# Patient Record
Sex: MALE | Race: OTHER | HISPANIC OR LATINO | URBAN - METROPOLITAN AREA
[De-identification: names, ages, dates, MRNs, and addresses within clinical notes are randomized per-mention and may not be internally consistent; named-entity substitution may affect disease eponyms.]

---

## 2023-06-14 ENCOUNTER — INPATIENT (INPATIENT)
Facility: HOSPITAL | Age: 28
LOS: 1 days | Discharge: HOME | DRG: 392 | End: 2023-06-16
Attending: HOSPITALIST | Admitting: INTERNAL MEDICINE
Payer: MEDICAID

## 2023-06-14 VITALS
DIASTOLIC BLOOD PRESSURE: 89 MMHG | OXYGEN SATURATION: 99 % | HEART RATE: 78 BPM | TEMPERATURE: 99 F | SYSTOLIC BLOOD PRESSURE: 146 MMHG | RESPIRATION RATE: 16 BRPM | HEIGHT: 67 IN | WEIGHT: 179.9 LBS

## 2023-06-14 DIAGNOSIS — R10.9 UNSPECIFIED ABDOMINAL PAIN: ICD-10-CM

## 2023-06-14 LAB
ALBUMIN SERPL ELPH-MCNC: 4.3 G/DL — SIGNIFICANT CHANGE UP (ref 3.3–5)
ALBUMIN SERPL ELPH-MCNC: 4.5 G/DL — SIGNIFICANT CHANGE UP (ref 3.3–5)
ALP SERPL-CCNC: 83 U/L — SIGNIFICANT CHANGE UP (ref 40–120)
ALP SERPL-CCNC: 89 U/L — SIGNIFICANT CHANGE UP (ref 40–120)
ALT FLD-CCNC: 39 U/L — SIGNIFICANT CHANGE UP (ref 10–45)
ALT FLD-CCNC: 48 U/L — HIGH (ref 10–45)
ANION GAP SERPL CALC-SCNC: 9 MMOL/L — SIGNIFICANT CHANGE UP (ref 5–17)
ANION GAP SERPL CALC-SCNC: 9 MMOL/L — SIGNIFICANT CHANGE UP (ref 5–17)
APPEARANCE UR: CLEAR — SIGNIFICANT CHANGE UP
AST SERPL-CCNC: 20 U/L — SIGNIFICANT CHANGE UP (ref 10–40)
AST SERPL-CCNC: 22 U/L — SIGNIFICANT CHANGE UP (ref 10–40)
BASOPHILS # BLD AUTO: 0.05 K/UL — SIGNIFICANT CHANGE UP (ref 0–0.2)
BASOPHILS NFR BLD AUTO: 0.6 % — SIGNIFICANT CHANGE UP (ref 0–2)
BILIRUB DIRECT SERPL-MCNC: 0.2 MG/DL — SIGNIFICANT CHANGE UP (ref 0–0.3)
BILIRUB INDIRECT FLD-MCNC: 0.8 MG/DL — SIGNIFICANT CHANGE UP (ref 0.2–1)
BILIRUB SERPL-MCNC: 0.8 MG/DL — SIGNIFICANT CHANGE UP (ref 0.2–1.2)
BILIRUB SERPL-MCNC: 1 MG/DL — SIGNIFICANT CHANGE UP (ref 0.2–1.2)
BILIRUB UR-MCNC: NEGATIVE — SIGNIFICANT CHANGE UP
BUN SERPL-MCNC: 11 MG/DL — SIGNIFICANT CHANGE UP (ref 7–23)
BUN SERPL-MCNC: 8 MG/DL — SIGNIFICANT CHANGE UP (ref 7–23)
CALCIUM SERPL-MCNC: 8.9 MG/DL — SIGNIFICANT CHANGE UP (ref 8.4–10.5)
CALCIUM SERPL-MCNC: 9.4 MG/DL — SIGNIFICANT CHANGE UP (ref 8.4–10.5)
CHLORIDE SERPL-SCNC: 102 MMOL/L — SIGNIFICANT CHANGE UP (ref 96–108)
CHLORIDE SERPL-SCNC: 102 MMOL/L — SIGNIFICANT CHANGE UP (ref 96–108)
CO2 SERPL-SCNC: 27 MMOL/L — SIGNIFICANT CHANGE UP (ref 22–31)
CO2 SERPL-SCNC: 29 MMOL/L — SIGNIFICANT CHANGE UP (ref 22–31)
COLOR SPEC: YELLOW — SIGNIFICANT CHANGE UP
CREAT SERPL-MCNC: 0.76 MG/DL — SIGNIFICANT CHANGE UP (ref 0.5–1.3)
CREAT SERPL-MCNC: 0.8 MG/DL — SIGNIFICANT CHANGE UP (ref 0.5–1.3)
DIFF PNL FLD: NEGATIVE — SIGNIFICANT CHANGE UP
EGFR: 124 ML/MIN/1.73M2 — SIGNIFICANT CHANGE UP
EGFR: 127 ML/MIN/1.73M2 — SIGNIFICANT CHANGE UP
EOSINOPHIL # BLD AUTO: 0.04 K/UL — SIGNIFICANT CHANGE UP (ref 0–0.5)
EOSINOPHIL NFR BLD AUTO: 0.5 % — SIGNIFICANT CHANGE UP (ref 0–6)
GLUCOSE SERPL-MCNC: 89 MG/DL — SIGNIFICANT CHANGE UP (ref 70–99)
GLUCOSE SERPL-MCNC: 96 MG/DL — SIGNIFICANT CHANGE UP (ref 70–99)
GLUCOSE UR QL: NEGATIVE MG/DL — SIGNIFICANT CHANGE UP
HCT VFR BLD CALC: 50.1 % — HIGH (ref 39–50)
HGB BLD-MCNC: 17.6 G/DL — HIGH (ref 13–17)
IMM GRANULOCYTES NFR BLD AUTO: 0.2 % — SIGNIFICANT CHANGE UP (ref 0–0.9)
KETONES UR-MCNC: NEGATIVE MG/DL — SIGNIFICANT CHANGE UP
LEUKOCYTE ESTERASE UR-ACNC: NEGATIVE — SIGNIFICANT CHANGE UP
LIDOCAIN IGE QN: 68 U/L — LOW (ref 73–393)
LYMPHOCYTES # BLD AUTO: 1.94 K/UL — SIGNIFICANT CHANGE UP (ref 1–3.3)
LYMPHOCYTES # BLD AUTO: 24 % — SIGNIFICANT CHANGE UP (ref 13–44)
MAGNESIUM SERPL-MCNC: 1.9 MG/DL — SIGNIFICANT CHANGE UP (ref 1.6–2.6)
MCHC RBC-ENTMCNC: 32.7 PG — SIGNIFICANT CHANGE UP (ref 27–34)
MCHC RBC-ENTMCNC: 35.1 GM/DL — SIGNIFICANT CHANGE UP (ref 32–36)
MCV RBC AUTO: 93.1 FL — SIGNIFICANT CHANGE UP (ref 80–100)
MONOCYTES # BLD AUTO: 0.54 K/UL — SIGNIFICANT CHANGE UP (ref 0–0.9)
MONOCYTES NFR BLD AUTO: 6.7 % — SIGNIFICANT CHANGE UP (ref 2–14)
NEUTROPHILS # BLD AUTO: 5.49 K/UL — SIGNIFICANT CHANGE UP (ref 1.8–7.4)
NEUTROPHILS NFR BLD AUTO: 68 % — SIGNIFICANT CHANGE UP (ref 43–77)
NITRITE UR-MCNC: NEGATIVE — SIGNIFICANT CHANGE UP
NRBC # BLD: 0 /100 WBCS — SIGNIFICANT CHANGE UP (ref 0–0)
PH UR: 6.5 — SIGNIFICANT CHANGE UP (ref 5–8)
PHOSPHATE SERPL-MCNC: 3.3 MG/DL — SIGNIFICANT CHANGE UP (ref 2.5–4.5)
PLATELET # BLD AUTO: 242 K/UL — SIGNIFICANT CHANGE UP (ref 150–400)
POTASSIUM SERPL-MCNC: 3.5 MMOL/L — SIGNIFICANT CHANGE UP (ref 3.5–5.3)
POTASSIUM SERPL-MCNC: 3.7 MMOL/L — SIGNIFICANT CHANGE UP (ref 3.5–5.3)
POTASSIUM SERPL-SCNC: 3.5 MMOL/L — SIGNIFICANT CHANGE UP (ref 3.5–5.3)
POTASSIUM SERPL-SCNC: 3.7 MMOL/L — SIGNIFICANT CHANGE UP (ref 3.5–5.3)
PROT SERPL-MCNC: 7.6 G/DL — SIGNIFICANT CHANGE UP (ref 6–8.3)
PROT SERPL-MCNC: 8.1 G/DL — SIGNIFICANT CHANGE UP (ref 6–8.3)
PROT UR-MCNC: NEGATIVE MG/DL — SIGNIFICANT CHANGE UP
RBC # BLD: 5.38 M/UL — SIGNIFICANT CHANGE UP (ref 4.2–5.8)
RBC # FLD: 13.2 % — SIGNIFICANT CHANGE UP (ref 10.3–14.5)
SODIUM SERPL-SCNC: 138 MMOL/L — SIGNIFICANT CHANGE UP (ref 135–145)
SODIUM SERPL-SCNC: 140 MMOL/L — SIGNIFICANT CHANGE UP (ref 135–145)
SP GR SPEC: 1.04 — HIGH (ref 1–1.03)
UROBILINOGEN FLD QL: 0.2 MG/DL — SIGNIFICANT CHANGE UP (ref 0.2–1)
WBC # BLD: 8.08 K/UL — SIGNIFICANT CHANGE UP (ref 3.8–10.5)
WBC # FLD AUTO: 8.08 K/UL — SIGNIFICANT CHANGE UP (ref 3.8–10.5)

## 2023-06-14 PROCEDURE — 99223 1ST HOSP IP/OBS HIGH 75: CPT

## 2023-06-14 PROCEDURE — 99285 EMERGENCY DEPT VISIT HI MDM: CPT

## 2023-06-14 PROCEDURE — 71045 X-RAY EXAM CHEST 1 VIEW: CPT | Mod: 26

## 2023-06-14 PROCEDURE — 74177 CT ABD & PELVIS W/CONTRAST: CPT | Mod: 26,MA

## 2023-06-14 RX ORDER — ACETAMINOPHEN 500 MG
650 TABLET ORAL EVERY 6 HOURS
Refills: 0 | Status: DISCONTINUED | OUTPATIENT
Start: 2023-06-14 | End: 2023-06-16

## 2023-06-14 RX ORDER — SODIUM CHLORIDE 9 MG/ML
1000 INJECTION INTRAMUSCULAR; INTRAVENOUS; SUBCUTANEOUS ONCE
Refills: 0 | Status: COMPLETED | OUTPATIENT
Start: 2023-06-14 | End: 2023-06-14

## 2023-06-14 RX ORDER — SODIUM CHLORIDE 9 MG/ML
1000 INJECTION INTRAMUSCULAR; INTRAVENOUS; SUBCUTANEOUS
Refills: 0 | Status: DISCONTINUED | OUTPATIENT
Start: 2023-06-14 | End: 2023-06-14

## 2023-06-14 RX ORDER — ONDANSETRON 8 MG/1
4 TABLET, FILM COATED ORAL ONCE
Refills: 0 | Status: COMPLETED | OUTPATIENT
Start: 2023-06-14 | End: 2023-06-14

## 2023-06-14 RX ORDER — SODIUM CHLORIDE 9 MG/ML
1000 INJECTION, SOLUTION INTRAVENOUS
Refills: 0 | Status: DISCONTINUED | OUTPATIENT
Start: 2023-06-14 | End: 2023-06-16

## 2023-06-14 RX ORDER — HYDROMORPHONE HYDROCHLORIDE 2 MG/ML
0.2 INJECTION INTRAMUSCULAR; INTRAVENOUS; SUBCUTANEOUS EVERY 6 HOURS
Refills: 0 | Status: DISCONTINUED | OUTPATIENT
Start: 2023-06-14 | End: 2023-06-16

## 2023-06-14 RX ORDER — MORPHINE SULFATE 50 MG/1
4 CAPSULE, EXTENDED RELEASE ORAL ONCE
Refills: 0 | Status: DISCONTINUED | OUTPATIENT
Start: 2023-06-14 | End: 2023-06-14

## 2023-06-14 RX ORDER — ONDANSETRON 8 MG/1
4 TABLET, FILM COATED ORAL EVERY 6 HOURS
Refills: 0 | Status: DISCONTINUED | OUTPATIENT
Start: 2023-06-14 | End: 2023-06-16

## 2023-06-14 RX ORDER — PANTOPRAZOLE SODIUM 20 MG/1
40 TABLET, DELAYED RELEASE ORAL
Refills: 0 | Status: DISCONTINUED | OUTPATIENT
Start: 2023-06-14 | End: 2023-06-15

## 2023-06-14 RX ORDER — FAMOTIDINE 10 MG/ML
20 INJECTION INTRAVENOUS ONCE
Refills: 0 | Status: COMPLETED | OUTPATIENT
Start: 2023-06-14 | End: 2023-06-14

## 2023-06-14 RX ORDER — HYDROMORPHONE HYDROCHLORIDE 2 MG/ML
0.5 INJECTION INTRAMUSCULAR; INTRAVENOUS; SUBCUTANEOUS EVERY 6 HOURS
Refills: 0 | Status: DISCONTINUED | OUTPATIENT
Start: 2023-06-14 | End: 2023-06-16

## 2023-06-14 RX ADMIN — SODIUM CHLORIDE 1000 MILLILITER(S): 9 INJECTION INTRAMUSCULAR; INTRAVENOUS; SUBCUTANEOUS at 13:50

## 2023-06-14 RX ADMIN — MORPHINE SULFATE 4 MILLIGRAM(S): 50 CAPSULE, EXTENDED RELEASE ORAL at 14:08

## 2023-06-14 RX ADMIN — FAMOTIDINE 20 MILLIGRAM(S): 10 INJECTION INTRAVENOUS at 14:38

## 2023-06-14 RX ADMIN — PANTOPRAZOLE SODIUM 40 MILLIGRAM(S): 20 TABLET, DELAYED RELEASE ORAL at 18:34

## 2023-06-14 RX ADMIN — SODIUM CHLORIDE 100 MILLILITER(S): 9 INJECTION, SOLUTION INTRAVENOUS at 19:10

## 2023-06-14 RX ADMIN — SODIUM CHLORIDE 1000 MILLILITER(S): 9 INJECTION INTRAMUSCULAR; INTRAVENOUS; SUBCUTANEOUS at 12:51

## 2023-06-14 RX ADMIN — MORPHINE SULFATE 4 MILLIGRAM(S): 50 CAPSULE, EXTENDED RELEASE ORAL at 14:38

## 2023-06-14 RX ADMIN — ONDANSETRON 4 MILLIGRAM(S): 8 TABLET, FILM COATED ORAL at 13:22

## 2023-06-14 RX ADMIN — FAMOTIDINE 100 MILLIGRAM(S): 10 INJECTION INTRAVENOUS at 14:08

## 2023-06-14 NOTE — CONSULT NOTE ADULT - PROBLEM SELECTOR RECOMMENDATION 9
Gastritis vs ulcer  NPO  IV fluids  CT abdomen reviewed- neg pathology seen  Will plan for EGD +/_ colonoscopy friday Gastritis vs ulcer  NPO  IV fluids  CT abdomen reviewed- neg pathology seen  Will plan for EGD +/- colonoscopy friday

## 2023-06-14 NOTE — CONSULT NOTE ADULT - NS ATTEND OPT1 GEN_ALL_CORE
I attest my time as attending is greater than 50% of the total combined time spent on qualifying patient care activities by the PA/NP and attending.
Hector Dorado(Attending)

## 2023-06-14 NOTE — H&P ADULT - NSHPREVIEWOFSYSTEMS_GEN_ALL_CORE
REVIEW OF SYSTEMS:  CONSTITUTIONAL: No fever, weight loss, or fatigue  EYES: No eye pain, visual disturbances, or discharge  ENMT:  No difficulty hearing, tinnitus, vertigo; No sinus or throat pain  NECK: No pain or stiffness  BREASTS: No pain, masses, or nipple discharge  RESPIRATORY: No cough, wheezing, chills or hemoptysis; No shortness of breath  CARDIOVASCULAR: No chest pain, palpitations, dizziness, or leg swelling  GASTROINTESTINAL: +abdominal or epigastric pain. +nausea, +vomiting, or hematemesis; No diarrhea or constipation. No melena or hematochezia.  GENITOURINARY: No dysuria, frequency, hematuria, or incontinence  NEUROLOGICAL: No headaches, memory loss, loss of strength, numbness, or tremors  SKIN: No itching, burning, rashes, or lesions   LYMPH NODES: No enlarged glands  ENDOCRINE: No heat or cold intolerance; No hair loss  MUSCULOSKELETAL: No joint pain or swelling; No muscle, back, or extremity pain  PSYCHIATRIC: No depression, anxiety, mood swings, or difficulty sleeping  HEME/LYMPH: No easy bruising, or bleeding gums  ALLERY AND IMMUNOLOGIC: No hives or eczema    ALL ROS REVIEWED AND NORMAL EXCEPT AS STATED ABOVE

## 2023-06-14 NOTE — ED PROVIDER NOTE - OBJECTIVE STATEMENT
29 yo M w no pertinent PMH presents to the ED c/o left abdominal pain x 15 days. States the pain is a burning sensation that has worsened today, being a 10/10 pain. Pain radiates to the lower back. Pt states he also has been vomiting after he eats his meals, unable to tolerate food. States the vomit consists of dark blood and is yellowish in consistency. States that he has vomited a total of 4 times today. Pt has normal BM, but this morning he had a painful BM. Denies blood in stool. States this has occurred once before 2 months prior where he took ibuprofen for the pain. States the ibuprofen helped him in the past. Denies alcohol, smoking, illicit drug use. Denies HA, fever, chills. SOB, chest pain, D/C, muscle weakness.

## 2023-06-14 NOTE — ED ADULT NURSE NOTE - NSFALLUNIVINTERV_ED_ALL_ED
Bed/Stretcher in lowest position, wheels locked, appropriate side rails in place/Call bell, personal items and telephone in reach/Instruct patient to call for assistance before getting out of bed/chair/stretcher/Non-slip footwear applied when patient is off stretcher/[place] to call system/Physically safe environment - no spills, clutter or unnecessary equipment/Purposeful proactive rounding/Room/bathroom lighting operational, light cord in reach

## 2023-06-14 NOTE — H&P ADULT - NSHPPHYSICALEXAM_GEN_ALL_CORE
T(C): 37.1 (06-14-23 @ 11:24), Max: 37.1 (06-14-23 @ 11:24)  HR: 78 (06-14-23 @ 11:24) (78 - 78)  BP: 146/89 (06-14-23 @ 11:24) (146/89 - 146/89)  RR: 16 (06-14-23 @ 11:24) (16 - 16)  SpO2: 99% (06-14-23 @ 11:24) (99% - 99%)  Wt(kg): --Vital Signs Last 24 Hrs  T(C): 37.1 (14 Jun 2023 11:24), Max: 37.1 (14 Jun 2023 11:24)  T(F): 98.8 (14 Jun 2023 11:24), Max: 98.8 (14 Jun 2023 11:24)  HR: 78 (14 Jun 2023 11:24) (78 - 78)  BP: 146/89 (14 Jun 2023 11:24) (146/89 - 146/89)  BP(mean): --  RR: 16 (14 Jun 2023 11:24) (16 - 16)  SpO2: 99% (14 Jun 2023 11:24) (99% - 99%)    Parameters below as of 14 Jun 2023 11:24  Patient On (Oxygen Delivery Method): room air        PHYSICAL EXAM:  GENERAL: NAD, well-groomed, well-developed  HEAD:  Atraumatic, Normocephalic  EYES: EOMI, PERRLA, conjunctiva and sclera clear  ENMT: No tonsillar erythema, exudates, or enlargement; Moist mucous membranes, Good dentition, No lesions  NECK: Supple, No JVD, Normal thyroid  NERVOUS SYSTEM:  Alert & Oriented X3, Good concentration; Motor Strength 5/5 B/L upper and lower extremities; DTRs 2+ intact and symmetric  CHEST/LUNG: Clear to percussion bilaterally; No rales, rhonchi, wheezing, or rubs  HEART: Regular rate and rhythm; No murmurs, rubs, or gallops  ABDOMEN: Soft, Epigastric tenderness, Nondistended; Bowel sounds present  EXTREMITIES:  2+ Peripheral Pulses, No clubbing, cyanosis, or edema  LYMPH: No lymphadenopathy noted  SKIN: No rashes or lesions

## 2023-06-14 NOTE — CONSULT NOTE ADULT - ASSESSMENT
29 y/o male w no pertinent PMH presents to the ED c/o left abdominal pain x 15 days. Patient seen and examined at bedside. Reports vomiting multiple times, slightly blood tinged mucous noted. Patient had nose bleed after. No fever/chills. Soft bowel movements prior to admission x 3. No brbpr or melena. No etoh abuse. Non smoker.

## 2023-06-14 NOTE — H&P ADULT - NS ATTEND AMEND GEN_ALL_CORE FT
Seen and examined. Chart reviewed.   patient is improving clinically.   Agree with above a/p  Edited where ever is necessary    abd pain better with meds. for EGD/COLON ON 6/16. on CIWA with symptoms trigger ativan. patient reported h/o alcohol abuse. sober for 2 yrs. no cig or drugs. would DC CIWA in am if no sign of withdrawal.

## 2023-06-14 NOTE — ED ADULT NURSE NOTE - CHIEF COMPLAINT QUOTE
Pt has LUQ pain n/v for 15 days.  Pt denies fevers, but has chills and dizziness. Pt states he has blood in the vomit and epistaxis.  Pt in severe pain 10/10.

## 2023-06-14 NOTE — ED PROVIDER NOTE - DISPOSITION TYPE
----- Message from Callie Jenkins RN sent at 8/29/2018  2:49 PM CDT -----  Contact: briana      ----- Message -----  From: Sailaja Chase  Sent: 8/29/2018   2:45 PM  To: Irasema LARSON Staff    ----- Message from [facility], System Message sent at 8/29/2018  2:01 PM CDT -----    Appointment Request From: Roslyn Brewster    With Provider: Turner bennett    Preferred Date Range: 8/29/2018 - 9/7/2018    Preferred Times: Any time    Reason for visit: Medication    Comments:  Would like to speak to someone        ADMIT

## 2023-06-14 NOTE — H&P ADULT - NSHPLABSRESULTS_GEN_ALL_CORE
17.6   8.08  )-----------( 242      ( 2023 12:53 )             50.1       -14    140  |  102  |  11  ----------------------------<  89  3.7   |  29  |  0.80    Ca    9.4      2023 12:53    TPro  8.1  /  Alb  4.5  /  TBili  0.8  /  DBili  x   /  AST  22  /  ALT  48  /  AlkPhos  89  -14                  Urinalysis Basic - ( 2023 13:45 )    Color: Yellow / Appearance: Clear / S.044 / pH: x  Gluc: x / Ketone: Negative mg/dL  / Bili: Negative / Urobili: 0.2 mg/dL   Blood: x / Protein: Negative mg/dL / Nitrite: Negative   Leuk Esterase: Negative / RBC: x / WBC x   Sq Epi: x / Non Sq Epi: x / Bacteria: x    < from: CT Abdomen and Pelvis w/ IV Cont (23 @ 13:23) >    IMPRESSION:  No imaging explanation for left lower quadrant pain.        --- End of Report ---            JAYNE ROSADO MD; Attending Radiologist  This document has been electronically signed. 2023  1:38PM    < end of copied text >

## 2023-06-14 NOTE — H&P ADULT - NSHPSOCIALHISTORY_GEN_ALL_CORE
patient reports living with his brother rigoberto  mother is alive age 49 history of high blood pressure  father is alive age 51 denies any medical history  reports not drinking alcohol for two years, denies drug or nicotine use

## 2023-06-14 NOTE — CONSULT NOTE ADULT - NS ATTEND AMEND GEN_ALL_CORE FT
Patient seen and examined at the bedside. Agree with the assessment and plan of TRELL Mike. Unexplained abd pain. GI workup as above.

## 2023-06-14 NOTE — H&P ADULT - ASSESSMENT
28 year old male with no significant past medical history presents with abdominal pain also with associated nausea and vomiting    Abdominal pain with Nausea and Vomiting  CT AP with IV contrast unremarkable  GI consult appreciated - Gastritis vs Ulcer  Plan to keep NPO for now, IVF, Protonix BID  Plan for EGD +/- Colonoscopy for Friday 6/16  Pain management, Antiemetics, monitor labs    DVT PPx  SCDs    spoke to pt brother rigoberto [phone] and provided an update 28 year old male with no significant past medical history presents with abdominal pain also with associated nausea and vomiting    Abdominal pain with Nausea and Vomiting  CT AP with IV contrast unremarkable  GI consult appreciated - Gastritis vs Ulcer  Plan to keep NPO for now, IVF, Protonix BID  Plan for EGD +/- Colonoscopy for Friday 6/16  Pain management, Antiemetics, monitor labs    Possible Hx ETOH Abuse  maintained on symptom triggered CIWA  monitor for signs of withdrawal    DVT PPx  SCDs    Called brother rigoberto [phone] today in order to provide an update, no answer, left  with call back number

## 2023-06-14 NOTE — CONSULT NOTE ADULT - SUBJECTIVE AND OBJECTIVE BOX
INTERVAL HPI/OVERNIGHT EVENTS:  HPI:  29 y/o male w no pertinent PMH presents to the ED c/o left abdominal pain x 15 days. Patient seen and examined at bedside. Re     States the pain is a burning sensation that has worsened today, being a 10/10 pain. Pain radiates to the lower back. Pt states he also has been vomiting after he eats his meals, unable to tolerate food. States the vomit consists of dark blood and is yellowish in consistency. States that he has vomited a total of 4 times today. Pt has normal BM, but this morning he had a painful BM. Denies blood in stool. States this has occurred once before 2 months prior where he took ibuprofen for the pain. States the ibuprofen helped him in the past. Denies alcohol, smoking, illicit drug use. Denies HA, fever, chills. SOB, chest pain, D/C, muscleMEDICATIONS  (STANDING):    MEDICATIONS  (PRN):      Allergies    No Known Allergies    Intolerances        PAST MEDICAL & SURGICAL HISTORY:  No pertinent past medical history      No significant past surgical history          REVIEW OF SYSTEMS: negative unless indicated in HPI    non smoker  former ETOH abuse       PHYSICAL EXAM:   Vital Signs:  Vital Signs Last 24 Hrs  T(C): 37.1 (2023 11:24), Max: 37.1 (2023 11:24)  T(F): 98.8 (2023 11:24), Max: 98.8 (2023 11:24)  HR: 78 (2023 11:24) (78 - 78)  BP: 146/89 (2023 11:24) (146/89 - 146/89)  BP(mean): --  RR: 16 (2023 11:24) (16 - 16)  SpO2: 99% (2023 11:24) (99% - 99%)    Parameters below as of 2023 11:24  Patient On (Oxygen Delivery Method): room air      Daily Height in cm: 170.18 (2023 11:24)    Daily I&O's Summary      GENERAL:  Appears stated age, well-groomed, well-nourished, no distress  HEENT:  NC/AT, no JVD, sclera anicteric  CHEST:  Full & symmetric excursion, no increased effort, breath sounds clear  HEART:  Regular rhythm, S1, S2  ABDOMEN:  Soft, tender, non-distended, normoactive bowel sounds,    EXTREMITIES:  no edema  SKIN:  No rash  NEURO:  Alert, oriented,       LABS:                        17.6   8.08  )-----------( 242      ( 2023 12:53 )             50.1         140  |  102  |  11  ----------------------------<  89  3.7   |  29  |  0.80    Ca    9.4      2023 12:53    TPro  8.1  /  Alb  4.5  /  TBili  0.8  /  DBili  x   /  AST  22  /  ALT  48<H>  /  AlkPhos  89        Urinalysis Basic - ( 2023 13:45 )    Color: Yellow / Appearance: Clear / S.044 / pH: x  Gluc: x / Ketone: Negative mg/dL  / Bili: Negative / Urobili: 0.2 mg/dL   Blood: x / Protein: Negative mg/dL / Nitrite: Negative   Leuk Esterase: Negative / RBC: x / WBC x   Sq Epi: x / Non Sq Epi: x / Bacteria: x      amylase   lipaseLipase, Serum: 68 U/L ( @ 12:53)    RADIOLOGY & ADDITIONAL TESTS:   INTERVAL HPI/OVERNIGHT EVENTS:  HPI:     States the pain is a burning sensation that has worsened today, being a 10/10 pain. Pain radiates to the lower back. Pt states he also has been vomiting after he eats his meals, unable to tolerate food. States the vomit consists of dark blood and is yellowish in consistency. States that he has vomited a total of 4 times today. Pt has normal BM, but this morning he had a painful BM. Denies blood in stool. States this has occurred once before 2 months prior where he took ibuprofen for the pain. States the ibuprofen helped him in the past. Denies alcohol, smoking, illicit drug use. Denies HA, fever, chills. SOB, chest pain, D/C, muscle    29 y/o male w no pertinent PMH presents to the ED c/o left abdominal pain x 15 days. Patient seen and examined at bedside. Reports vomiting multiple times, slightly blood tinged mucous noted. Patient had nose bleed after. No fever/chills. Soft bowel movements prior to admission x 3. No brbpr or melena. No etoh abuse. Non smoker.       MEDICATIONS  (STANDING):    MEDICATIONS  (PRN):      Allergies    No Known Allergies    Intolerances        PAST MEDICAL & SURGICAL HISTORY:  No pertinent past medical history      No significant past surgical history          REVIEW OF SYSTEMS: negative unless indicated in HPI    non smoker  former ETOH abuse       PHYSICAL EXAM:   Vital Signs:  Vital Signs Last 24 Hrs  T(C): 37.1 (2023 11:24), Max: 37.1 (2023 11:24)  T(F): 98.8 (2023 11:24), Max: 98.8 (2023 11:24)  HR: 78 (2023 11:24) (78 - 78)  BP: 146/89 (2023 11:24) (146/89 - 146/89)  BP(mean): --  RR: 16 (2023 11:24) (16 - 16)  SpO2: 99% (2023 11:24) (99% - 99%)    Parameters below as of 2023 11:24  Patient On (Oxygen Delivery Method): room air      Daily Height in cm: 170.18 (2023 11:24)    Daily I&O's Summary      GENERAL:  Appears stated age, well-groomed, well-nourished, no distress  HEENT:  NC/AT, no JVD, sclera anicteric  CHEST:  Full & symmetric excursion, no increased effort, breath sounds clear  HEART:  Regular rhythm, S1, S2  ABDOMEN:  Soft, tender, non-distended, normoactive bowel sounds,    EXTREMITIES:  no edema  SKIN:  No rash  NEURO:  Alert, oriented,       LABS:                        17.6   8.08  )-----------( 242      ( 2023 12:53 )             50.1         140  |  102  |  11  ----------------------------<  89  3.7   |  29  |  0.80    Ca    9.4      2023 12:53    TPro  8.1  /  Alb  4.5  /  TBili  0.8  /  DBili  x   /  AST  22  /  ALT  48<H>  /  AlkPhos  89        Urinalysis Basic - ( 2023 13:45 )    Color: Yellow / Appearance: Clear / S.044 / pH: x  Gluc: x / Ketone: Negative mg/dL  / Bili: Negative / Urobili: 0.2 mg/dL   Blood: x / Protein: Negative mg/dL / Nitrite: Negative   Leuk Esterase: Negative / RBC: x / WBC x   Sq Epi: x / Non Sq Epi: x / Bacteria: x      amylase   lipaseLipase, Serum: 68 U/L ( @ 12:53)    RADIOLOGY & ADDITIONAL TESTS:   INTERVAL HPI/OVERNIGHT EVENTS:  HPI:   29 y/o male w no pertinent PMH presents to the ED c/o left abdominal pain x 15 days. Patient seen and examined at bedside. Reports vomiting multiple times, slightly blood tinged mucous noted. Patient had nose bleed after. No fever/chills. Soft bowel movements prior to admission x 3. No brbpr or melena. No etoh abuse. Non smoker.   States the pain is a burning sensation that has worsened today, being a 10/10 pain. Pain radiates to the lower back. Pt states he also has been vomiting after he eats his meals, unable to tolerate food. States the vomit consists of dark blood and is yellowish in consistency. States that he has vomited a total of 4 times today. Pt has normal BM, but this morning he had a painful BM. Denies blood in stool. States this has occurred once before 2 months prior where he took ibuprofen for the pain. States the ibuprofen helped him in the past. Denies alcohol, smoking, illicit drug use. Denies HA, fever, chills. SOB, chest pain, D/C, muscle      Home Medications:None    Allergies    No Known Allergies    Intolerances        PAST MEDICAL & SURGICAL HISTORY:  No pertinent past medical history      No significant past surgical history          REVIEW OF SYSTEMS: negative unless indicated in HPI    non smoker  former ETOH abuse       PHYSICAL EXAM:   Vital Signs:  Vital Signs Last 24 Hrs  T(C): 37.1 (2023 11:24), Max: 37.1 (2023 11:24)  T(F): 98.8 (2023 11:24), Max: 98.8 (2023 11:24)  HR: 78 (2023 11:24) (78 - 78)  BP: 146/89 (2023 11:24) (146/89 - 146/89)  BP(mean): --  RR: 16 (2023 11:24) (16 - 16)  SpO2: 99% (2023 11:24) (99% - 99%)    Parameters below as of 2023 11:24  Patient On (Oxygen Delivery Method): room air      Daily Height in cm: 170.18 (2023 11:24)    Daily I&O's Summary      GENERAL:  Appears stated age, well-groomed, well-nourished, no distress  HEENT:  NC/AT, no JVD, sclera anicteric  CHEST:  Full & symmetric excursion, no increased effort, breath sounds clear  HEART:  Regular rhythm, S1, S2  ABDOMEN:  Soft, tender, non-distended, normoactive bowel sounds,    EXTREMITIES:  no edema  SKIN:  No rash  NEURO:  Alert, oriented      LABS:                        17.6   8.08  )-----------( 242      ( 2023 12:53 )             50.1         140  |  102  |  11  ----------------------------<  89  3.7   |  29  |  0.80    Ca    9.4      2023 12:53    TPro  8.1  /  Alb  4.5  /  TBili  0.8  /  DBili  x   /  AST  22  /  ALT  48<H>  /  AlkPhos  89        Urinalysis Basic - ( 2023 13:45 )    Color: Yellow / Appearance: Clear / S.044 / pH: x  Gluc: x / Ketone: Negative mg/dL  / Bili: Negative / Urobili: 0.2 mg/dL   Blood: x / Protein: Negative mg/dL / Nitrite: Negative   Leuk Esterase: Negative / RBC: x / WBC x   Sq Epi: x / Non Sq Epi: x / Bacteria: x      Lipase, Serum: 68 U/L ( @ 12:53)          ACC: [id number] EXAM:  CT ABDOMEN AND PELVIS IC   ORDERED BY: BENJI RODRIGUEZ     PROCEDURE DATE:  2023          INTERPRETATION:  CLINICAL INFORMATION: Left lower quadrant tenderness.    COMPARISON: None.    CONTRAST/COMPLICATIONS:  IV Contrast: Omnipaque 350  90 cc administered   10 cc discarded  Oral Contrast: NONE  Complications: None reported at time of study completion    PROCEDURE:  CT of the Abdomen and Pelvis was performed.  Sagittal and coronal reformats were performed. The imaging was performed   in the excretory phase of contrast within the kidneys.    FINDINGS:  LOWER CHEST: Within normal limits.    LIVER: Within normal limits.  BILE DUCTS: Normal caliber.  GALLBLADDER: Within normal limits.  SPLEEN: Within normal limits.  PANCREAS: Within normal limits.  ADRENALS: Within normal limits.  KIDNEYS/URETERS: Within normal limits.    BLADDER: Within normal limits. Artifact related to the presence of   diluted excreted contrast.  REPRODUCTIVE ORGANS: Mildly enlarged prostate.    BOWEL: No bowel obstruction. Appendix is normal. Ingested radiopaque   material within the small bowel.  PERITONEUM: No ascites.  VESSELS: Circumaortic left renal veins.  RETROPERITONEUM/LYMPH NODES: No lymphadenopathy.  ABDOMINAL WALL: Withinnormal limits.  BONES: Within normal limits.    IMPRESSION:  No imaging explanation for left lower quadrant pain.        --- End of Report ---            JAYNE ROSADO MD; Attending Radiologist  This document has been electronically signed. 2023  1:38PM

## 2023-06-14 NOTE — ED ADULT TRIAGE NOTE - CHIEF COMPLAINT QUOTE
Pt has LUQ pain n/v for 15 days.  Pt denies fevers, but has chills and dizziness. pt states he has blood in the vomit and epistaxis. Pt has LUQ pain n/v for 15 days.  Pt denies fevers, but has chills and dizziness. Pt states he has blood in the vomit and epistaxis.  Pt in severe pain 10/10.

## 2023-06-14 NOTE — ED PROVIDER NOTE - CLINICAL SUMMARY MEDICAL DECISION MAKING FREE TEXT BOX
27 yo M w no pertinent PMH presents to the ED c/o left abdominal pain x 15 days. States the pain is a burning sensation that has worsened today, being a 10/10 pain. Pain radiates to the lower back. Pt states he also has been vomiting after he eats his meals, unable to tolerate food. States the vomit consists of dark blood and is yellowish in consistency. States that he has vomited a total of 4 times today. Pt has normal BM, but this morning he had a painful BM. Denies blood in stool. States this has occurred once before 2 months prior where he took ibuprofen for the pain. States the ibuprofen helped him in the past. Denies alcohol, smoking, illicit drug use. Denies HA, fever, chills. SOB, chest pain, D/C, muscle weakness.   LLQ and LUQ tenderness on exam   will check labs, ua, CT abd pelvis, pain control, zofran ordered and will re eval   Pt has no PMD. 29 yo M w no pertinent PMH presents to the ED c/o left abdominal pain x 15 days. States the pain is a burning sensation that has worsened today, being a 10/10 pain. Pain radiates to the lower back. Pt states he also has been vomiting after he eats his meals, unable to tolerate food. States the vomit consists of dark blood and is yellowish in consistency. States that he has vomited a total of 4 times today. Pt has normal BM, but this morning he had a painful BM. Denies blood in stool. States this has occurred once before 2 months prior where he took ibuprofen for the pain. States the ibuprofen helped him in the past. Denies alcohol, smoking, illicit drug use. Denies HA, fever, chills. SOB, chest pain, D/C, muscle weakness.   LLQ and LUQ tenderness on exam   will check labs, ua, CT abd pelvis, pain control, zofran ordered and will re eval   Pt has no PMD.   CT and labs reviewed   pt still in pain , GI called for c/s   1530: GI seen pt, TRELL Fields, and recommends pt for admission. Pt agrees with plan for admission. d/w Dr. Mooney

## 2023-06-15 LAB
ALBUMIN SERPL ELPH-MCNC: 3.8 G/DL — SIGNIFICANT CHANGE UP (ref 3.3–5)
ALP SERPL-CCNC: 70 U/L — SIGNIFICANT CHANGE UP (ref 40–120)
ALT FLD-CCNC: 39 U/L — SIGNIFICANT CHANGE UP (ref 10–45)
ANION GAP SERPL CALC-SCNC: 11 MMOL/L — SIGNIFICANT CHANGE UP (ref 5–17)
ANION GAP SERPL CALC-SCNC: 8 MMOL/L — SIGNIFICANT CHANGE UP (ref 5–17)
AST SERPL-CCNC: 23 U/L — SIGNIFICANT CHANGE UP (ref 10–40)
BILIRUB SERPL-MCNC: 1.3 MG/DL — HIGH (ref 0.2–1.2)
BUN SERPL-MCNC: 10 MG/DL — SIGNIFICANT CHANGE UP (ref 7–23)
BUN SERPL-MCNC: 10 MG/DL — SIGNIFICANT CHANGE UP (ref 7–23)
CALCIUM SERPL-MCNC: 8.6 MG/DL — SIGNIFICANT CHANGE UP (ref 8.4–10.5)
CALCIUM SERPL-MCNC: 8.7 MG/DL — SIGNIFICANT CHANGE UP (ref 8.4–10.5)
CHLORIDE SERPL-SCNC: 102 MMOL/L — SIGNIFICANT CHANGE UP (ref 96–108)
CHLORIDE SERPL-SCNC: 102 MMOL/L — SIGNIFICANT CHANGE UP (ref 96–108)
CO2 SERPL-SCNC: 26 MMOL/L — SIGNIFICANT CHANGE UP (ref 22–31)
CO2 SERPL-SCNC: 27 MMOL/L — SIGNIFICANT CHANGE UP (ref 22–31)
CREAT SERPL-MCNC: 0.83 MG/DL — SIGNIFICANT CHANGE UP (ref 0.5–1.3)
CREAT SERPL-MCNC: 0.86 MG/DL — SIGNIFICANT CHANGE UP (ref 0.5–1.3)
CRP SERPL-MCNC: <3 MG/L — SIGNIFICANT CHANGE UP
EGFR: 122 ML/MIN/1.73M2 — SIGNIFICANT CHANGE UP
EGFR: 123 ML/MIN/1.73M2 — SIGNIFICANT CHANGE UP
GLUCOSE SERPL-MCNC: 82 MG/DL — SIGNIFICANT CHANGE UP (ref 70–99)
GLUCOSE SERPL-MCNC: 84 MG/DL — SIGNIFICANT CHANGE UP (ref 70–99)
HCT VFR BLD CALC: 49.4 % — SIGNIFICANT CHANGE UP (ref 39–50)
HGB BLD-MCNC: 17 G/DL — SIGNIFICANT CHANGE UP (ref 13–17)
MAGNESIUM SERPL-MCNC: 2.2 MG/DL — SIGNIFICANT CHANGE UP (ref 1.6–2.6)
MCHC RBC-ENTMCNC: 32.3 PG — SIGNIFICANT CHANGE UP (ref 27–34)
MCHC RBC-ENTMCNC: 34.4 GM/DL — SIGNIFICANT CHANGE UP (ref 32–36)
MCV RBC AUTO: 93.7 FL — SIGNIFICANT CHANGE UP (ref 80–100)
NRBC # BLD: 0 /100 WBCS — SIGNIFICANT CHANGE UP (ref 0–0)
PHOSPHATE SERPL-MCNC: 3.5 MG/DL — SIGNIFICANT CHANGE UP (ref 2.5–4.5)
PLATELET # BLD AUTO: 201 K/UL — SIGNIFICANT CHANGE UP (ref 150–400)
POTASSIUM SERPL-MCNC: 3.8 MMOL/L — SIGNIFICANT CHANGE UP (ref 3.5–5.3)
POTASSIUM SERPL-MCNC: 3.9 MMOL/L — SIGNIFICANT CHANGE UP (ref 3.5–5.3)
POTASSIUM SERPL-SCNC: 3.8 MMOL/L — SIGNIFICANT CHANGE UP (ref 3.5–5.3)
POTASSIUM SERPL-SCNC: 3.9 MMOL/L — SIGNIFICANT CHANGE UP (ref 3.5–5.3)
PROT SERPL-MCNC: 7.2 G/DL — SIGNIFICANT CHANGE UP (ref 6–8.3)
RBC # BLD: 5.27 M/UL — SIGNIFICANT CHANGE UP (ref 4.2–5.8)
RBC # FLD: 13.3 % — SIGNIFICANT CHANGE UP (ref 10.3–14.5)
SODIUM SERPL-SCNC: 137 MMOL/L — SIGNIFICANT CHANGE UP (ref 135–145)
SODIUM SERPL-SCNC: 139 MMOL/L — SIGNIFICANT CHANGE UP (ref 135–145)
WBC # BLD: 6.16 K/UL — SIGNIFICANT CHANGE UP (ref 3.8–10.5)
WBC # FLD AUTO: 6.16 K/UL — SIGNIFICANT CHANGE UP (ref 3.8–10.5)

## 2023-06-15 PROCEDURE — 99233 SBSQ HOSP IP/OBS HIGH 50: CPT

## 2023-06-15 PROCEDURE — 99232 SBSQ HOSP IP/OBS MODERATE 35: CPT

## 2023-06-15 RX ORDER — PANTOPRAZOLE SODIUM 20 MG/1
40 TABLET, DELAYED RELEASE ORAL EVERY 12 HOURS
Refills: 0 | Status: DISCONTINUED | OUTPATIENT
Start: 2023-06-15 | End: 2023-06-16

## 2023-06-15 RX ORDER — SODIUM CHLORIDE 9 MG/ML
1000 INJECTION INTRAMUSCULAR; INTRAVENOUS; SUBCUTANEOUS
Refills: 0 | Status: DISCONTINUED | OUTPATIENT
Start: 2023-06-15 | End: 2023-06-16

## 2023-06-15 RX ADMIN — Medication 650 MILLIGRAM(S): at 10:12

## 2023-06-15 RX ADMIN — Medication 650 MILLIGRAM(S): at 11:46

## 2023-06-15 RX ADMIN — SODIUM CHLORIDE 100 MILLILITER(S): 9 INJECTION, SOLUTION INTRAVENOUS at 15:11

## 2023-06-15 RX ADMIN — PANTOPRAZOLE SODIUM 40 MILLIGRAM(S): 20 TABLET, DELAYED RELEASE ORAL at 17:35

## 2023-06-15 RX ADMIN — PANTOPRAZOLE SODIUM 40 MILLIGRAM(S): 20 TABLET, DELAYED RELEASE ORAL at 08:07

## 2023-06-15 NOTE — PROGRESS NOTE ADULT - PROBLEM SELECTOR PLAN 1
Due to persistent pain will pursue EGD. Explained to patient and he agrees to procedure.  Continue current meds.  Clear liquids ok now, NPO after midnight.

## 2023-06-16 ENCOUNTER — TRANSCRIPTION ENCOUNTER (OUTPATIENT)
Age: 28
End: 2023-06-16

## 2023-06-16 ENCOUNTER — RESULT REVIEW (OUTPATIENT)
Age: 28
End: 2023-06-16

## 2023-06-16 VITALS
RESPIRATION RATE: 20 BRPM | OXYGEN SATURATION: 98 % | TEMPERATURE: 98 F | HEIGHT: 67.01 IN | HEART RATE: 54 BPM | DIASTOLIC BLOOD PRESSURE: 80 MMHG | SYSTOLIC BLOOD PRESSURE: 125 MMHG | WEIGHT: 179.9 LBS

## 2023-06-16 PROBLEM — Z00.00 ENCOUNTER FOR PREVENTIVE HEALTH EXAMINATION: Status: ACTIVE | Noted: 2023-06-16

## 2023-06-16 LAB
ANION GAP SERPL CALC-SCNC: 8 MMOL/L — SIGNIFICANT CHANGE UP (ref 5–17)
BUN SERPL-MCNC: 9 MG/DL — SIGNIFICANT CHANGE UP (ref 7–23)
CALCIUM SERPL-MCNC: 8.4 MG/DL — SIGNIFICANT CHANGE UP (ref 8.4–10.5)
CHLORIDE SERPL-SCNC: 103 MMOL/L — SIGNIFICANT CHANGE UP (ref 96–108)
CO2 SERPL-SCNC: 29 MMOL/L — SIGNIFICANT CHANGE UP (ref 22–31)
CREAT SERPL-MCNC: 0.88 MG/DL — SIGNIFICANT CHANGE UP (ref 0.5–1.3)
CULTURE RESULTS: SIGNIFICANT CHANGE UP
EGFR: 121 ML/MIN/1.73M2 — SIGNIFICANT CHANGE UP
GLUCOSE SERPL-MCNC: 89 MG/DL — SIGNIFICANT CHANGE UP (ref 70–99)
HCT VFR BLD CALC: 47.8 % — SIGNIFICANT CHANGE UP (ref 39–50)
HGB BLD-MCNC: 16.7 G/DL — SIGNIFICANT CHANGE UP (ref 13–17)
MCHC RBC-ENTMCNC: 32.4 PG — SIGNIFICANT CHANGE UP (ref 27–34)
MCHC RBC-ENTMCNC: 34.9 GM/DL — SIGNIFICANT CHANGE UP (ref 32–36)
MCV RBC AUTO: 92.8 FL — SIGNIFICANT CHANGE UP (ref 80–100)
NRBC # BLD: 0 /100 WBCS — SIGNIFICANT CHANGE UP (ref 0–0)
PLATELET # BLD AUTO: 201 K/UL — SIGNIFICANT CHANGE UP (ref 150–400)
POTASSIUM SERPL-MCNC: 3.9 MMOL/L — SIGNIFICANT CHANGE UP (ref 3.5–5.3)
POTASSIUM SERPL-SCNC: 3.9 MMOL/L — SIGNIFICANT CHANGE UP (ref 3.5–5.3)
RBC # BLD: 5.15 M/UL — SIGNIFICANT CHANGE UP (ref 4.2–5.8)
RBC # FLD: 12.9 % — SIGNIFICANT CHANGE UP (ref 10.3–14.5)
SODIUM SERPL-SCNC: 140 MMOL/L — SIGNIFICANT CHANGE UP (ref 135–145)
SPECIMEN SOURCE: SIGNIFICANT CHANGE UP
WBC # BLD: 6.01 K/UL — SIGNIFICANT CHANGE UP (ref 3.8–10.5)
WBC # FLD AUTO: 6.01 K/UL — SIGNIFICANT CHANGE UP (ref 3.8–10.5)

## 2023-06-16 PROCEDURE — 88305 TISSUE EXAM BY PATHOLOGIST: CPT | Mod: 26

## 2023-06-16 PROCEDURE — 99239 HOSP IP/OBS DSCHRG MGMT >30: CPT

## 2023-06-16 PROCEDURE — 88312 SPECIAL STAINS GROUP 1: CPT | Mod: 26

## 2023-06-16 PROCEDURE — 43239 EGD BIOPSY SINGLE/MULTIPLE: CPT

## 2023-06-16 RX ORDER — PANTOPRAZOLE SODIUM 20 MG/1
1 TABLET, DELAYED RELEASE ORAL
Qty: 30 | Refills: 0
Start: 2023-06-16 | End: 2023-07-15

## 2023-06-16 RX ADMIN — Medication 650 MILLIGRAM(S): at 01:01

## 2023-06-16 RX ADMIN — Medication 650 MILLIGRAM(S): at 00:31

## 2023-06-16 NOTE — DISCHARGE NOTE PROVIDER - NSDCFUSCHEDAPPT_GEN_ALL_CORE_FT
[facility] Physician [facility]  [facility]  Scheduled Appointment: 06/22/2023

## 2023-06-16 NOTE — PROGRESS NOTE ADULT - SUBJECTIVE AND OBJECTIVE BOX
Patient is a 28y old  Male who presents with a chief complaint of Abdominal Pain (15 Salvador 2023 18:00)        Patient seen and examined at bedside. No overnight events reported.     ALLERGIES:  No Known Allergies    MEDICATIONS  (STANDING):  lactated ringers. 1000 milliLiter(s) (100 mL/Hr) IV Continuous <Continuous>  pantoprazole    Tablet 40 milliGRAM(s) Oral every 12 hours  sodium chloride 0.9%. 1000 milliLiter(s) (100 mL/Hr) IV Continuous <Continuous>    MEDICATIONS  (PRN):  acetaminophen     Tablet .. 650 milliGRAM(s) Oral every 6 hours PRN Temp greater or equal to 38C (100.4F), Mild Pain (1 - 3)  aluminum hydroxide/magnesium hydroxide/simethicone Suspension 30 milliLiter(s) Oral every 6 hours PRN Dyspepsia  HYDROmorphone  Injectable 0.2 milliGRAM(s) IV Push every 6 hours PRN Moderate Pain (4 - 6)  HYDROmorphone  Injectable 0.5 milliGRAM(s) IV Push every 6 hours PRN Severe Pain (7 - 10)  LORazepam   Injectable 2 milliGRAM(s) IV Push every 2 hours PRN CIWA-Ar score 8 or greater  ondansetron Injectable 4 milliGRAM(s) IV Push every 6 hours PRN Nausea and/or Vomiting    Vital Signs Last 24 Hrs  T(F): 97.7 (2023 05:18), Max: 98.4 (2023 01:10)  HR: 55 (2023 05:18) (51 - 55)  BP: 116/73 (2023 05:18) (116/73 - 121/76)  RR: 14 (2023 05:18) (14 - 18)  SpO2: 99% (2023 05:18) (98% - 99%)  I&O's Summary    PHYSICAL EXAM:  General: NAD, A/O x 3  ENT: No gross hearing impairment, Moist mucous membranes, no thrush  Neck: Supple, No JVD  Lungs: Clear to auscultation bilaterally, good air entry, non-labored breathing  Cardio: RRR, S1/S2, No murmur  Abdomen: Soft, Nontender, Nondistended; Bowel sounds present  Extremities: No calf tenderness, No cyanosis, No pitting edema  Psych: Appropriate mood and affect    LABS:                        16.7   6.01  )-----------( 201      ( 2023 06:52 )             47.8     06-16    140  |  103  |  9   ----------------------------<  89  3.9   |  29  |  0.88    Ca    8.4      2023 06:52  Phos  3.5     06-15  Mg     2.2     06-15    TPro  7.2  /  Alb  3.8  /  TBili  1.3  /  DBili  x   /  AST  23  /  ALT  39  /  AlkPhos  70  06-15    Lipase, Serum: 68 U/L (23 @ 12:53)    Urinalysis Basic - ( 2023 13:45 )    Color: Yellow / Appearance: Clear / S.044 / pH: x  Gluc: x / Ketone: Negative mg/dL  / Bili: Negative / Urobili: 0.2 mg/dL   Blood: x / Protein: Negative mg/dL / Nitrite: Negative   Leuk Esterase: Negative / RBC: x / WBC x   Sq Epi: x / Non Sq Epi: x / Bacteria: x    RADIOLOGY & ADDITIONAL TESTS:    Care Discussed with Consultants/Other Providers:   
Patient is a 28y old  Male who presents with a chief complaint of Abdominal Pain (2023 16:43)      SUBJECTIVE / OVERNIGHT EVENTS:   used   Pt seen and examined at bedside in the presence of his family member. No acute events overnight. Endorses LUQ pain and tenderness  Pt denies cp, palpitations, sob, N/V, fever, chills.    ROS:  All other review of systems negative    Allergies    No Known Allergies    Intolerances        MEDICATIONS  (STANDING):  lactated ringers. 1000 milliLiter(s) (100 mL/Hr) IV Continuous <Continuous>  pantoprazole  Injectable 40 milliGRAM(s) IV Push two times a day    MEDICATIONS  (PRN):  acetaminophen     Tablet .. 650 milliGRAM(s) Oral every 6 hours PRN Temp greater or equal to 38C (100.4F), Mild Pain (1 - 3)  aluminum hydroxide/magnesium hydroxide/simethicone Suspension 30 milliLiter(s) Oral every 6 hours PRN Dyspepsia  HYDROmorphone  Injectable 0.2 milliGRAM(s) IV Push every 6 hours PRN Moderate Pain (4 - 6)  HYDROmorphone  Injectable 0.5 milliGRAM(s) IV Push every 6 hours PRN Severe Pain (7 - 10)  LORazepam   Injectable 2 milliGRAM(s) IV Push every 2 hours PRN CIWA-Ar score 8 or greater  ondansetron Injectable 4 milliGRAM(s) IV Push every 6 hours PRN Nausea and/or Vomiting      Vital Signs Last 24 Hrs  T(C): 36.3 (15 Salvador 2023 05:36), Max: 37.1 (2023 11:24)  T(F): 97.4 (15 Salvador 2023 05:36), Max: 98.8 (2023 11:24)  HR: 60 (15 Salvador 2023 05:36) (60 - 81)  BP: 105/59 (15 Salvador 2023 05:36) (105/59 - 146/89)  BP(mean): --  RR: 18 (15 Salvador 2023 05:36) (16 - 20)  SpO2: 98% (15 Salvador 2023 05:36) (97% - 100%)    Parameters below as of 15 Salvador 2023 05:36  Patient On (Oxygen Delivery Method): room air      CAPILLARY BLOOD GLUCOSE        I&O's Summary      PHYSICAL EXAM:  GENERAL: NAD, well-developed male  HEAD:  Atraumatic, Normocephalic  NECK: Supple, No JVD  CHEST/LUNG: Clear to auscultation bilaterally; No wheeze, nonlabored breathing  HEART: Regular rate and rhythm; No murmurs, rubs, or gallops  ABDOMEN: Soft, LUQ tenderness, Nondistended; Bowel sounds present  EXTREMITIES:  No clubbing, cyanosis, or edema  NEUROLOGY: AAOx3, non-focal  PSYCH: calm, appropriate mood    LABS:                        17.0   6.16  )-----------( 201      ( 15 Salvador 2023 06:56 )             49.4     06-15    137  |  102  |  10  ----------------------------<  82  3.9   |  27  |  0.83    Ca    8.6      15 Salvadro 2023 06:56  Phos  3.5     06-15  Mg     2.2     06-15    TPro  7.2  /  Alb  3.8  /  TBili  1.3<H>  /  DBili  x   /  AST  23  /  ALT  39  /  AlkPhos  70  06-15          Urinalysis Basic - ( 2023 13:45 )    Color: Yellow / Appearance: Clear / S.044 / pH: x  Gluc: x / Ketone: Negative mg/dL  / Bili: Negative / Urobili: 0.2 mg/dL   Blood: x / Protein: Negative mg/dL / Nitrite: Negative   Leuk Esterase: Negative / RBC: x / WBC x   Sq Epi: x / Non Sq Epi: x / Bacteria: x        RADIOLOGY & ADDITIONAL TESTS:  Results Reviewed:   Imaging Personally Reviewed:  Electrocardiogram Personally Reviewed:    COORDINATION OF CARE:  Care Discussed with Consultants/Other Providers [Y/N]:  Prior or Outpatient Records Reviewed [Y/N]:
GI Follow Up    Seen and examined at the bedside.  Joann #[id number]  Patient resting, admits to moderate LUQ pain. Denies hematemesis, rectal bleeding, melena, change in bowel habits. No nausea today.    MEDICATIONS  (STANDING):  lactated ringers. 1000 milliLiter(s) (100 mL/Hr) IV Continuous <Continuous>  pantoprazole    Tablet 40 milliGRAM(s) Oral every 12 hours  sodium chloride 0.9%. 1000 milliLiter(s) (100 mL/Hr) IV Continuous <Continuous>    MEDICATIONS  (PRN):  acetaminophen     Tablet .. 650 milliGRAM(s) Oral every 6 hours PRN Temp greater or equal to 38C (100.4F), Mild Pain (1 - 3)  aluminum hydroxide/magnesium hydroxide/simethicone Suspension 30 milliLiter(s) Oral every 6 hours PRN Dyspepsia  HYDROmorphone  Injectable 0.2 milliGRAM(s) IV Push every 6 hours PRN Moderate Pain (4 - 6)  HYDROmorphone  Injectable 0.5 milliGRAM(s) IV Push every 6 hours PRN Severe Pain (7 - 10)  LORazepam   Injectable 2 milliGRAM(s) IV Push every 2 hours PRN CIWA-Ar score 8 or greater  ondansetron Injectable 4 milliGRAM(s) IV Push every 6 hours PRN Nausea and/or Vomiting      Diet, NPO after Midnight:      NPO Start Date: 15-Salvador-2023,   NPO Start Time: 23:59 (06-15-23 @ 17:55) [Active]  Diet, Clear Liquid (06-15-23 @ 17:55) [Active]          PHYSICAL EXAM:   Vital Signs:  Vital Signs Last 24 Hrs  T(C): 36.3 (15 Salvador 2023 05:36), Max: 36.3 (15 Salvador 2023 05:36)  T(F): 97.4 (15 Salvador 2023 05:36), Max: 97.4 (15 Salvador 2023 05:36)  HR: 60 (15 Salvador 2023 05:36) (60 - 66)  BP: 105/59 (15 Salvador 2023 05:36) (105/59 - 112/62)  BP(mean): --  RR: 18 (15 Salvador 2023 05:36) (18 - 18)  SpO2: 98% (15 Salvador 2023 05:36) (98% - 99%)    Parameters below as of 15 Salvador 2023 05:36  Patient On (Oxygen Delivery Method): room air      Daily     Daily I&O's Summary      GENERAL:   NAD  HEENT:  NC/AT,  anicteric sclera  CHEST:  Clear B/L  HEART:  RRR  ABDOMEN:  Soft, mild LUQ tenderness, non-distended, +BS,  no masses palpable  EXTR:  no edema  SKIN:  No rash or jaundice  NEURO:  Awake, alert    LABS:                        17.0   6.16  )-----------( 201      ( 15 Salvador 2023 06:56 )             49.4       06-15    137  |  102  |  10  ----------------------------<  82  3.9   |  27  |  0.83    Ca    8.6      15 Salvador 2023 06:56  Phos  3.5     06-15  Mg     2.2     06-15    TPro  7.2  /  Alb  3.8  /  TBili  1.3<H>  /  DBili  x   /  AST  23  /  ALT  39  /  AlkPhos  70  06-15

## 2023-06-16 NOTE — PROGRESS NOTE ADULT - ASSESSMENT
28 year old male with no significant past medical history presents with abdominal pain also with associated nausea and vomiting    Suspected ETOH abuse  - labs WNL  - GI following  - possible EGD/ with GI today given persistent pain     DVT PPx  SCDs
28 year old man, no significant PMHx,, admitted with LUQ abdominal pain, nausea and vomiting  
28 year old male with no significant past medical history presents with abdominal pain also with associated nausea and vomiting    Abdominal pain with Nausea and Vomiting  CT AP with IV contrast unremarkable  GI consult appreciated - Gastritis vs Ulcer  NPO for now  Protonix BID  Plan for EGD +/- Colonoscopy tomorrow  Pain management, Antiemetics, monitor labs    Possible Hx ETOH Abuse  maintained on symptom triggered CIWA  monitor for signs of withdrawal    DVT PPx  SCDs

## 2023-06-16 NOTE — DISCHARGE NOTE PROVIDER - HOSPITAL COURSE
Hospital Course  HPI:  Pt unable to provider history in English, used  services name [facility] Id # [id number]    Patient is a 28 year old male denies any significant past medical history.  Pt presents with abdominal pain described as sharp, mostly non radiating. Reports abdominal pain is mostly felt in the left lower quadrant of his abdomen.  Pt reports abdominal pain has been ongoing for about fifteen days, reports waking up this am with pain that was more intense and unrelenting.  Reports abdominal pain is worse upon eating, has taken ibuprofen that has helped.  Reports associated nausea and vomiting.  Reports vomiting about fifteen times today.  Reports noticing some blood when he threw up.  Reports usually vomiting after eating.  Denies alcohol or drug abuse.  Pt reports abdominal pain and nausea have since improved.  Denies fever, chills, diarrhea, sob, chest pain, recent sick contacts. or blood in stool.     In ED CT AP with IV contrast unremarkable (14 Jun 2023 16:43)    You were admitted for abdominal pain, nausea and vomiting.      Patient underwent an endoscopy with any significant findings.    Patient's symptoms resolved.      You will need to follow up with your primary care physician.    Discharging Provider:  Ananda Umanzor MD  Contact Info: Cell [phone] - Please call with any questions or concerns.    Outpatient Provider:

## 2023-06-16 NOTE — DISCHARGE NOTE PROVIDER - NSDCCPCAREPLAN_GEN_ALL_CORE_FT
PRINCIPAL DISCHARGE DIAGNOSIS  Diagnosis: Left sided abdominal pain  Assessment and Plan of Treatment: You were admitted for abdominal pain, nausea and vomiting.    Patient underwent an endoscopy with any significant findings.    Patient's symptoms resolved.    You will need to follow up with your primary care physician.  Discharging Provider:  Ananda Umanzor MD  Contact Info: Cell [phone] - Please call with any questions or concerns.

## 2023-06-16 NOTE — DISCHARGE NOTE NURSING/CASE MANAGEMENT/SOCIAL WORK - NSDCPEFALRISK_GEN_ALL_CORE
For information on Fall & Injury Prevention, visit: [email or web address] OR  [email or web address] OR  https://www.cdc.gov/steadi/patient.html

## 2023-06-16 NOTE — DISCHARGE NOTE NURSING/CASE MANAGEMENT/SOCIAL WORK - PATIENT PORTAL LINK FT
You can access the FollowMyHealth Patient Portal offered by [facility] by registering at the following website: [email or web address]. By joining [employer]’s FollowMyHealth portal, you will also be able to view your health information using other applications (apps) compatible with our system.

## 2023-06-16 NOTE — DISCHARGE NOTE NURSING/CASE MANAGEMENT/SOCIAL WORK - NSDCFUADDAPPT_GEN_ALL_CORE_FT
We made you a hospital followup appointment with Dr. Godinez on 6/22/23 at 1:30pm, office #[phone].

## 2023-06-20 LAB — SURGICAL PATHOLOGY STUDY: SIGNIFICANT CHANGE UP

## 2023-06-22 ENCOUNTER — OUTPATIENT (OUTPATIENT)
Dept: OUTPATIENT SERVICES | Facility: HOSPITAL | Age: 28
LOS: 1 days | End: 2023-06-22
Payer: SELF-PAY

## 2023-06-22 ENCOUNTER — APPOINTMENT (OUTPATIENT)
Dept: FAMILY MEDICINE | Facility: HOSPITAL | Age: 28
End: 2023-06-22

## 2023-06-22 VITALS
OXYGEN SATURATION: 99 % | RESPIRATION RATE: 16 BRPM | HEART RATE: 59 BPM | WEIGHT: 174 LBS | DIASTOLIC BLOOD PRESSURE: 79 MMHG | HEIGHT: 66 IN | BODY MASS INDEX: 27.97 KG/M2 | SYSTOLIC BLOOD PRESSURE: 125 MMHG | TEMPERATURE: 98.6 F

## 2023-06-22 DIAGNOSIS — Z00.00 ENCOUNTER FOR GENERAL ADULT MEDICAL EXAMINATION WITHOUT ABNORMAL FINDINGS: ICD-10-CM

## 2023-06-22 DIAGNOSIS — K29.60 OTHER GASTRITIS WITHOUT BLEEDING: ICD-10-CM

## 2023-06-22 DIAGNOSIS — K29.60 OTHER GASTRITIS W/OUT BLEEDING: ICD-10-CM

## 2023-06-22 DIAGNOSIS — A04.8 OTHER SPECIFIED BACTERIAL INTESTINAL INFECTIONS: ICD-10-CM

## 2023-06-22 PROBLEM — Z78.9 OTHER SPECIFIED HEALTH STATUS: Chronic | Status: ACTIVE | Noted: 2023-06-14

## 2023-06-22 PROCEDURE — G0463: CPT

## 2023-06-22 RX ORDER — AMOXICILLIN 500 MG/1
500 CAPSULE ORAL
Qty: 40 | Refills: 0 | Status: ACTIVE | COMMUNITY
Start: 2023-06-22 | End: 1900-01-01

## 2023-06-22 RX ORDER — PANTOPRAZOLE 40 MG/1
40 TABLET, DELAYED RELEASE ORAL
Qty: 28 | Refills: 0 | Status: ACTIVE | COMMUNITY
Start: 2023-06-22 | End: 1900-01-01

## 2023-06-22 RX ORDER — CLARITHROMYCIN 500 MG/1
500 TABLET, FILM COATED ORAL
Qty: 28 | Refills: 0 | Status: ACTIVE | COMMUNITY
Start: 2023-06-22 | End: 1900-01-01

## 2023-06-22 NOTE — REVIEW OF SYSTEMS
[Abdominal Pain] : abdominal pain [Negative] : Gastrointestinal [Vomiting] : no vomiting [Headache] : no headache [Dizziness] : no dizziness

## 2023-06-22 NOTE — PHYSICAL EXAM
[Normal] : soft, non-tender, non-distended, no masses palpated, no HSM and normal bowel sounds [No CVA Tenderness] : no CVA  tenderness [Normal Gait] : normal gait [Normal Affect] : the affect was normal [de-identified] : +mild ttp LUQ

## 2023-06-22 NOTE — HISTORY OF PRESENT ILLNESS
[Post-hospitalization from ___ Hospital] : Post-hospitalization from [unfilled] Hospital [Admitted on: ___] : The patient was admitted on [unfilled] [Discharged on ___] : discharged on [unfilled] [FreeTextEntry2] : Patient is a 28 year old M with no pertinent PMHx who presented to  ED with sharp LLQ abdominal pain x15 days with 15 episodes of bloody emesis on day of admission. An endoscopy was performed which revealed normal mucosa in the entire esophagus. There was non-erosive gastritis in the antrum and stomach body. Cold forceps bx results showed H. Pylori like bacilli. At this time patient states he has occasional pain only with no emesis.

## 2023-07-09 ENCOUNTER — NON-APPOINTMENT (OUTPATIENT)
Age: 28
End: 2023-07-09

## 2023-07-20 PROCEDURE — 71045 X-RAY EXAM CHEST 1 VIEW: CPT

## 2023-07-20 PROCEDURE — 80048 BASIC METABOLIC PNL TOTAL CA: CPT

## 2023-07-20 PROCEDURE — 80076 HEPATIC FUNCTION PANEL: CPT

## 2023-07-20 PROCEDURE — 88305 TISSUE EXAM BY PATHOLOGIST: CPT

## 2023-07-20 PROCEDURE — 87086 URINE CULTURE/COLONY COUNT: CPT

## 2023-07-20 PROCEDURE — 99285 EMERGENCY DEPT VISIT HI MDM: CPT

## 2023-07-20 PROCEDURE — 84100 ASSAY OF PHOSPHORUS: CPT

## 2023-07-20 PROCEDURE — 85027 COMPLETE CBC AUTOMATED: CPT

## 2023-07-20 PROCEDURE — 96374 THER/PROPH/DIAG INJ IV PUSH: CPT

## 2023-07-20 PROCEDURE — 74177 CT ABD & PELVIS W/CONTRAST: CPT | Mod: MA

## 2023-07-20 PROCEDURE — 36415 COLL VENOUS BLD VENIPUNCTURE: CPT

## 2023-07-20 PROCEDURE — 80053 COMPREHEN METABOLIC PANEL: CPT

## 2023-07-20 PROCEDURE — 85025 COMPLETE CBC W/AUTO DIFF WBC: CPT

## 2023-07-20 PROCEDURE — 96375 TX/PRO/DX INJ NEW DRUG ADDON: CPT

## 2023-07-20 PROCEDURE — 83735 ASSAY OF MAGNESIUM: CPT

## 2023-07-20 PROCEDURE — 88312 SPECIAL STAINS GROUP 1: CPT

## 2023-07-20 PROCEDURE — 83690 ASSAY OF LIPASE: CPT

## 2023-07-20 PROCEDURE — 86140 C-REACTIVE PROTEIN: CPT

## 2023-07-28 ENCOUNTER — APPOINTMENT (OUTPATIENT)
Dept: FAMILY MEDICINE | Facility: HOSPITAL | Age: 28
End: 2023-07-28
